# Patient Record
Sex: MALE | Race: WHITE | NOT HISPANIC OR LATINO | URBAN - METROPOLITAN AREA
[De-identification: names, ages, dates, MRNs, and addresses within clinical notes are randomized per-mention and may not be internally consistent; named-entity substitution may affect disease eponyms.]

---

## 2017-05-20 ENCOUNTER — EMERGENCY (EMERGENCY)
Facility: HOSPITAL | Age: 57
LOS: 0 days | Discharge: HOME | End: 2017-05-20
Attending: EMERGENCY MEDICINE | Admitting: EMERGENCY MEDICINE
Payer: COMMERCIAL

## 2017-05-20 VITALS
HEIGHT: 66 IN | HEART RATE: 103 BPM | DIASTOLIC BLOOD PRESSURE: 93 MMHG | WEIGHT: 175.05 LBS | SYSTOLIC BLOOD PRESSURE: 153 MMHG | OXYGEN SATURATION: 95 % | TEMPERATURE: 98 F | RESPIRATION RATE: 18 BRPM

## 2017-05-20 DIAGNOSIS — R04.0 EPISTAXIS: ICD-10-CM

## 2017-05-20 PROCEDURE — 99283 EMERGENCY DEPT VISIT LOW MDM: CPT

## 2017-05-20 PROCEDURE — 30901 CONTROL OF NOSEBLEED: CPT | Mod: RT

## 2017-05-20 RX ADMIN — Medication 1 TABLET(S): at 19:20

## 2017-05-20 NOTE — ED STATDOCS - ATTENDING CONTRIBUTION TO CARE
I personally saw the patient with the NP, and completed the key components of the history and physical exam. I then discussed the management plan with the NP.  I, Jhon Yusuf,, performed the initial face to face bedside interview with this patient regarding history of present illness, review of symptoms and relevant past medical, social and family history.  I completed an independent physical examination.  I was the initial provider who evaluated this patient.  The history, relevant review of systems, past medical and surgical history, medical decision making, and physical examination was documented by the scribe in my presence and I attest to the accuracy of the documentation.

## 2017-05-20 NOTE — ED ADULT NURSE NOTE - OBJECTIVE STATEMENT
pt presents to ed for eval of nosebleed that started at 430 pm spontanously . pt denies nay injry or trauma . pt has mod amount of bleeding on baby asa daily. pt denies any headache

## 2017-05-20 NOTE — ED ADULT TRIAGE NOTE - CHIEF COMPLAINT QUOTE
Nosebleed starting about 45 mins ago after sneezing 3x, stopped, then started again, no blood thinners

## 2017-05-20 NOTE — ED STATDOCS - OBJECTIVE STATEMENT
58 y/o male currently on baby aspirin presents to ED due to epistaxis. Pt sneezed and began to bleed out of the right nostril.

## 2017-05-20 NOTE — ED STATDOCS - PROGRESS NOTE DETAILS
57 yr. old male PMH: Aortic Valve Di present sto ED with epistaxsis onset at 2:30 pm lasting 25 min and again at 4:30. Currently taking aspirin. 57 yr. old male PMH: Aortic Valve Di present to ED with epistaxsis onset at 2:30 pm lasting 25 min and again at 4:30. Currently taking aspirin. Admits to drinking alcohol daily. Seen and examined by attending in Intake. Plan: Rapid Rhino, Augmentin. Will F/U and re evaluate.  PE: HEENT PERHEMANT EOMS intact,  Nose; + active  bleeding right nostril.  MTangredi NP 57 yr. old male PMH: Aortic Valve Di present to ED with epistaxsis left nostril after sneezing onset at 2:30 pm lasting 25 min and again at 4:30. Currently taking aspirin. Admits to drinking alcohol daily. Seen and examined by attending in Intake. Plan: Rapid Rhino, Augmentin. Will F/U and re evaluate.  PE: HEENT PERRLA EOMS intact,  Nose; + active  bleeding right nostril.  MTangredi NP

## 2017-05-20 NOTE — ED STATDOCS - NS ED MD SCRIBE ATTENDING SCRIBE SECTIONS
VITAL SIGNS( Pullset)/PHYSICAL EXAM/DISPOSITION/CONSULTATIONS/SHIFT CHANGE/PAST MEDICAL/SURGICAL/SOCIAL HISTORY/REVIEW OF SYSTEMS/PROGRESS NOTE/RESULTS/HISTORY OF PRESENT ILLNESS

## 2022-10-08 ENCOUNTER — OFFICE (OUTPATIENT)
Dept: URBAN - METROPOLITAN AREA CLINIC 12 | Facility: CLINIC | Age: 62
Setting detail: OPHTHALMOLOGY
End: 2022-10-08
Payer: COMMERCIAL

## 2022-10-08 DIAGNOSIS — H35.033: ICD-10-CM

## 2022-10-08 DIAGNOSIS — H25.13: ICD-10-CM

## 2022-10-08 PROCEDURE — 92004 COMPRE OPH EXAM NEW PT 1/>: CPT | Performed by: OPTOMETRIST

## 2022-10-08 PROCEDURE — 92250 FUNDUS PHOTOGRAPHY W/I&R: CPT | Performed by: OPTOMETRIST

## 2022-10-08 ASSESSMENT — KERATOMETRY
OD_K2POWER_DIOPTERS: 47.50
OS_AXISANGLE_DEGREES: 091
OS_K1POWER_DIOPTERS: 47.00
OD_K1POWER_DIOPTERS: 47.25
OD_AXISANGLE_DEGREES: 087
OS_K2POWER_DIOPTERS: 47.50

## 2022-10-08 ASSESSMENT — TONOMETRY
OD_IOP_MMHG: 13
OS_IOP_MMHG: 13

## 2022-10-08 ASSESSMENT — REFRACTION_AUTOREFRACTION
OD_CYLINDER: -0.25
OD_SPHERE: +1.00
OD_AXIS: 092
OS_AXIS: 0
OS_SPHERE: +0.50
OS_CYLINDER: SPHERE

## 2022-10-08 ASSESSMENT — AXIALLENGTH_DERIVED: OD_AL: 21.9504

## 2022-10-08 ASSESSMENT — CONFRONTATIONAL VISUAL FIELD TEST (CVF)
OS_FINDINGS: FULL
OD_FINDINGS: FULL

## 2022-10-08 ASSESSMENT — VISUAL ACUITY
OD_BCVA: 20/20
OS_BCVA: 20/20

## 2022-10-08 ASSESSMENT — SPHEQUIV_DERIVED: OD_SPHEQUIV: 0.875
